# Patient Record
Sex: MALE | Race: ASIAN | NOT HISPANIC OR LATINO | Employment: FULL TIME | URBAN - METROPOLITAN AREA
[De-identification: names, ages, dates, MRNs, and addresses within clinical notes are randomized per-mention and may not be internally consistent; named-entity substitution may affect disease eponyms.]

---

## 2018-11-16 ENCOUNTER — OFFICE VISIT - HEALTHEAST (OUTPATIENT)
Dept: FAMILY MEDICINE | Facility: CLINIC | Age: 23
End: 2018-11-16

## 2018-11-16 DIAGNOSIS — Z11.1 SCREENING-PULMONARY TB: ICD-10-CM

## 2018-11-19 LAB
GAMMA INTERFERON BACKGROUND BLD IA-ACNC: 0.09 IU/ML
M TB IFN-G BLD-IMP: NEGATIVE
MITOGEN IGNF BCKGRD COR BLD-ACNC: 0.03 IU/ML
MITOGEN IGNF BCKGRD COR BLD-ACNC: 0.07 IU/ML
QTF INTERPRETATION: NORMAL
QTF MITOGEN - NIL: >10 IU/ML

## 2018-11-21 ENCOUNTER — COMMUNICATION - HEALTHEAST (OUTPATIENT)
Dept: FAMILY MEDICINE | Facility: CLINIC | Age: 23
End: 2018-11-21

## 2018-12-07 ENCOUNTER — OFFICE VISIT - HEALTHEAST (OUTPATIENT)
Dept: FAMILY MEDICINE | Facility: CLINIC | Age: 23
End: 2018-12-07

## 2018-12-07 DIAGNOSIS — L85.3 XEROSIS CUTIS: ICD-10-CM

## 2018-12-07 DIAGNOSIS — L50.8 CHRONIC URTICARIA: ICD-10-CM

## 2018-12-07 RX ORDER — CETIRIZINE HYDROCHLORIDE 10 MG/1
10 TABLET ORAL DAILY
Qty: 30 TABLET | Refills: 5 | Status: SHIPPED | OUTPATIENT
Start: 2018-12-07

## 2018-12-07 RX ORDER — AMMONIUM LACTATE 12 G/100G
LOTION TOPICAL
Qty: 400 G | Refills: 5 | Status: SHIPPED | OUTPATIENT
Start: 2018-12-07

## 2021-06-02 VITALS — WEIGHT: 198 LBS | BODY MASS INDEX: 29.89 KG/M2

## 2021-06-21 NOTE — PROGRESS NOTES
Assessment/plan  1. Screening-pulmonary TB  - QTF-Mycobacterium tuberculosis by QuantiFERON-TB Gold Plus  Past medical history, past surgical history, problem list, social history, family history reviewed.   Denies past history of active of latent TB.   quantiferon gold testing collected. Will follow results.   Influenza vaccine today.       Subjective  [age] year old male here with his .   He requests testing for tuberculosis exposure. He is applying to work as a PCA.   He reports he was born in [facility]. Has had testing for TB before. Denies prior treatment. No known exposure to TB. No history of  work. No history of incarceration. He denies cough, hemoptysis, weight loss, sputum production, night sweats.     ROS: 12 systems reviewed, all negative except for what is mentioned in HPI.     Past Medical History:   Diagnosis Date     Smoker     since [age] years old     Patient Active Problem List   Diagnosis     Skin: A Rash     Allergic Urticaria     Urticaria     Smoker     No past surgical history on file.  Family History   Problem Relation Age of Onset     No Medical Problems Mother      No Medical Problems Father      No Medical Problems Sister      No Medical Problems Brother      Social History     Socioeconomic History     Marital status: Single     Spouse name: Not on file     Number of children: Not on file     Years of education: Not on file     Highest education level: Not on file   Social Needs     Financial resource strain: Not on file     Food insecurity - worry: Not on file     Food insecurity - inability: Not on file     Transportation needs - medical: Not on file     Transportation needs - non-medical: Not on file   Occupational History     Not on file   Tobacco Use     Smoking status: Current Every Day Smoker     Packs/day: 0.25     Types: Cigarettes     Smokeless tobacco: Never Used     Tobacco comment: Smoke once a day   Substance and Sexual Activity     Alcohol use: No      Drug use: No     Sexual activity: Yes     Partners: Female     Birth control/protection: Abstinence   Other Topics Concern     Not on file   Social History Narrative     Not on file     Current Outpatient Medications on File Prior to Visit   Medication Sig Dispense Refill     EPINEPHrine (EPIPEN 2-VIKAS) 0.3 mg/0.3 mL (1:1,000) atIn Inject 0.3 mL (0.3 mg total) into the shoulder, thigh, or buttocks once as needed. 0.3 mL 1     cetirizine (ZYRTEC) 10 MG tablet Take 1 tablet (10 mg total) by mouth 2 (two) times a day. 60 tablet 2     hydrOXYzine (ATARAX) 25 MG tablet Take 25-50 mg by mouth every 6 (six) hours as needed.       ibuprofen (ADVIL,MOTRIN) 200 MG tablet Take 1 tablet (200 mg total) by mouth every 6 (six) hours as needed for pain. 2-3 tablets as needed for pain 120 tablet 0     No current facility-administered medications on file prior to visit.      Objective  Vitals:    11/16/18 0941   BP: 142/80   Pulse: 84       General Appearance:  Alert, cooperative, no distress, appears stated age   Head:  Normocephalic, without obvious abnormality, atraumatic   Eyes:  PERRL, conjunctiva/corneas clear, EOM's intact   Throat: Lips, mucosa, and tongue normal; teeth and gums normal   Neck: Supple, symmetrical, trachea midline, no adenopathy   Lungs:   Clear to auscultation bilaterally, respirations unlabored   Heart:  Regular rate and rhythm, S1 and S2 normal, no murmur, rub, or gallop   Extremities: Extremities normal, atraumatic, no cyanosis or edema   Skin: Skin color, texture, turgor normal, no rashes or lesions     Recent Results (from the past 240 hour(s))   QTF-Mycobacterium tuberculosis by QuantiFERON-TB Gold Plus   Result Value Ref Range    QTF RESULT Negative Negative    QTF INTREPRETATION       No interferon-gamma response to M. tuberculosis antigens was detected.  Infecton with M. tuberculosis is unlikely.  A negative result alone does not exclude infection with M. tuberculosis    QTF NIL 0.09 IU/mL    QTF  ANTIGEN TB1-NIL 0.07 IU/mL    QTF ANTIGEN TB2 - NIL 0.03 IU/mL    QTF MITOGEN-NIL >10.00 IU/mL

## 2021-06-22 NOTE — PROGRESS NOTES
Assessment/plan  1. Chronic urticaria  2. Xerosis cutis  - cetirizine (ZYRTEC) 10 MG tablet; Take 1 tablet (10 mg total) by mouth daily.  Dispense: 30 tablet; Refill: 5  - ammonium lactate (AMLACTIN) 12 % lotion; Apply to affected area daily.  Dispense: 400 g; Refill: 5  Chronic and recurrent.   EHR reviewed. Seen by allergy. Advised to stay ion antihistamine. This was prescribed. Avoid known allergens and triggers. Discussed routine skin, need for daily emollient. This was prescribed. Advised on use of gentle detergents, soaps. Follow up if no change or worsening.           Subjective  [age] year old male here with his . He complains of diffusely itchy skin. This started a few weeks ago. There is no rash. He wants a refill for the medication he took before. He has known allergy to shellfish. He does not think he had any shellfish lately. He notes this is not new. Has seen allergist in the past. His skin is dry. He does not use any emollient or lotions. He does not taken any medication. No lip or tongue swelling. No trouble breathing.         ROS: 12 systems reviewed, all negative except for what is mentioned in HPI.     Past Medical History:   Diagnosis Date     Smoker     since [age] years old     Patient Active Problem List   Diagnosis     Skin: A Rash     Allergic Urticaria     Urticaria     Smoker     No past surgical history on file.  Family History   Problem Relation Age of Onset     No Medical Problems Mother      No Medical Problems Father      No Medical Problems Sister      No Medical Problems Brother      Social History     Socioeconomic History     Marital status: Single     Spouse name: Not on file     Number of children: Not on file     Years of education: Not on file     Highest education level: Not on file   Social Needs     Financial resource strain: Not on file     Food insecurity - worry: Not on file     Food insecurity - inability: Not on file     Transportation needs - medical:  Not on file     Transportation needs - non-medical: Not on file   Occupational History     Not on file   Tobacco Use     Smoking status: Current Every Day Smoker     Packs/day: 0.25     Types: Cigarettes     Smokeless tobacco: Never Used     Tobacco comment: Smoke once a day   Substance and Sexual Activity     Alcohol use: No     Drug use: No     Sexual activity: Yes     Partners: Female     Birth control/protection: Abstinence   Other Topics Concern     Not on file   Social History Narrative     Not on file     Current Outpatient Medications on File Prior to Visit   Medication Sig Dispense Refill     cetirizine (ZYRTEC) 10 MG tablet Take 1 tablet (10 mg total) by mouth 2 (two) times a day. 60 tablet 2     EPINEPHrine (EPIPEN 2-VIKAS) 0.3 mg/0.3 mL (1:1,000) atIn Inject 0.3 mL (0.3 mg total) into the shoulder, thigh, or buttocks once as needed. 0.3 mL 1     hydrOXYzine (ATARAX) 25 MG tablet Take 25-50 mg by mouth every 6 (six) hours as needed.       ibuprofen (ADVIL,MOTRIN) 200 MG tablet Take 1 tablet (200 mg total) by mouth every 6 (six) hours as needed for pain. 2-3 tablets as needed for pain 120 tablet 0     No current facility-administered medications on file prior to visit.      Objective  Vitals:    12/07/18 1118   BP: 130/80   Pulse: 89   Resp: 16   SpO2: 98%       General Appearance:  Alert, cooperative, no distress, appears stated age   Head:  Normocephalic, without obvious abnormality, atraumatic   Eyes:  PERRL, conjunctiva/corneas clear, EOM's intact   Throat: Lips, mucosa, and tongue normal   Neck: Supple, symmetrical, trachea midline, no adenopathy;  thyroid: not enlarged   Lungs:   Clear to auscultation bilaterally, respirations unlabored   Heart:  Regular rate and rhythm, S1 and S2 normal, no murmur, rub, or gallop   Extremities: Extremities normal, atraumatic, no cyanosis or edema   Skin: Diffusely dry skin   Lymph nodes: Cervical, supraclavicular nodes normal

## 2022-05-27 ENCOUNTER — LAB (OUTPATIENT)
Dept: LAB | Facility: CLINIC | Age: 27
End: 2022-05-27

## 2022-05-27 DIAGNOSIS — Z11.1 SCREENING EXAMINATION FOR PULMONARY TUBERCULOSIS: ICD-10-CM

## 2022-05-27 DIAGNOSIS — Z11.59 NEED FOR HEPATITIS C SCREENING TEST: ICD-10-CM

## 2022-05-27 DIAGNOSIS — Z11.1 SCREENING EXAMINATION FOR PULMONARY TUBERCULOSIS: Primary | ICD-10-CM

## 2022-05-27 DIAGNOSIS — Z11.4 SCREENING FOR HIV (HUMAN IMMUNODEFICIENCY VIRUS): ICD-10-CM

## 2022-05-27 PROCEDURE — 86481 TB AG RESPONSE T-CELL SUSP: CPT

## 2022-05-27 PROCEDURE — 36415 COLL VENOUS BLD VENIPUNCTURE: CPT

## 2022-05-27 NOTE — PROGRESS NOTES
Orders placed for TB test and health maintenance.   Due for lab + nurse visit for shots.     No future appointments.   Health Maintenance Due   Topic Date Due     ADVANCE CARE PLANNING  Never done     Pneumococcal Vaccine: Pediatrics (0 to 5 Years) and At-Risk Patients (6 to 64 Years) (1 - PCV) Never done     HIV SCREENING  Never done     HEPATITIS C SCREENING  Never done     HPV IMMUNIZATION (3 - Male 3-dose series) 05/15/2016     PREVENTIVE CARE VISIT  10/15/2016     COVID-19 Vaccine (3 - Booster for Pfizer series) 12/31/2021     PHQ-2 (once per calendar year)  Never done     BP Readings from Last 3 Encounters:   No data found for BP     Diagnoses and all orders for this visit:    Screening examination for pulmonary tuberculosis  -     Quantiferon-TB Gold Plus; Future    Screening for HIV (human immunodeficiency virus)  -     HIV Antigen Antibody Combo; Future    Need for hepatitis C screening test  -     Hepatitis C Screen Reflex to HCV RNA Quant and Genotype; Future    Other orders  -     REVIEW OF HEALTH MAINTENANCE PROTOCOL ORDERS  -     Human Papilloma Virus Vaccine (Gardasil 9) 3 Dose IM; Future  -     COVID-19,PF,PFIZER (12+ YRS); Future

## 2022-05-29 LAB
GAMMA INTERFERON BACKGROUND BLD IA-ACNC: 0.15 IU/ML
M TB IFN-G BLD-IMP: NEGATIVE
M TB IFN-G CD4+ BCKGRND COR BLD-ACNC: 9.85 IU/ML
MITOGEN IGNF BCKGRD COR BLD-ACNC: 0.05 IU/ML
MITOGEN IGNF BCKGRD COR BLD-ACNC: 0.23 IU/ML
QUANTIFERON MITOGEN: 10 IU/ML
QUANTIFERON NIL TUBE: 0.15 IU/ML
QUANTIFERON TB1 TUBE: 0.38 IU/ML
QUANTIFERON TB2 TUBE: 0.2

## 2022-05-31 ENCOUNTER — TELEPHONE (OUTPATIENT)
Dept: FAMILY MEDICINE | Facility: CLINIC | Age: 27
End: 2022-05-31

## 2022-05-31 NOTE — TELEPHONE ENCOUNTER
----- Message from Stacey Dumont MD sent at 5/31/2022  9:55 AM CDT -----  Please let Say know:  Your TB test is negative.